# Patient Record
Sex: MALE | Race: WHITE | NOT HISPANIC OR LATINO | URBAN - METROPOLITAN AREA
[De-identification: names, ages, dates, MRNs, and addresses within clinical notes are randomized per-mention and may not be internally consistent; named-entity substitution may affect disease eponyms.]

---

## 2021-04-29 ENCOUNTER — EMERGENCY (EMERGENCY)
Facility: HOSPITAL | Age: 56
LOS: 1 days | Discharge: HOME | End: 2021-04-29
Admitting: EMERGENCY MEDICINE
Payer: COMMERCIAL

## 2021-04-29 VITALS
DIASTOLIC BLOOD PRESSURE: 77 MMHG | OXYGEN SATURATION: 96 % | HEART RATE: 71 BPM | SYSTOLIC BLOOD PRESSURE: 117 MMHG | TEMPERATURE: 98 F | RESPIRATION RATE: 16 BRPM

## 2021-04-29 DIAGNOSIS — Z20.822 CONTACT WITH AND (SUSPECTED) EXPOSURE TO COVID-19: ICD-10-CM

## 2021-04-29 LAB — SARS-COV-2 RNA SPEC QL NAA+PROBE: SIGNIFICANT CHANGE UP

## 2021-04-29 PROCEDURE — 99283 EMERGENCY DEPT VISIT LOW MDM: CPT

## 2021-04-29 PROCEDURE — 99282 EMERGENCY DEPT VISIT SF MDM: CPT

## 2021-04-29 PROCEDURE — U0005: CPT

## 2021-04-29 PROCEDURE — U0003: CPT

## 2021-04-29 NOTE — ED PROVIDER NOTE - PATIENT PORTAL LINK FT
You can access the FollowMyHealth Patient Portal offered by [facility] by registering at the following website: [email or web address]. By joining [employer]’s FollowMyHealth portal, you will also be able to view your health information using other applications (apps) compatible with our system.

## 2021-08-01 ENCOUNTER — EMERGENCY (EMERGENCY)
Facility: HOSPITAL | Age: 56
LOS: 1 days | Discharge: HOME | End: 2021-08-01
Attending: EMERGENCY MEDICINE | Admitting: EMERGENCY MEDICINE
Payer: COMMERCIAL

## 2021-08-01 VITALS
HEART RATE: 57 BPM | SYSTOLIC BLOOD PRESSURE: 138 MMHG | TEMPERATURE: 98 F | RESPIRATION RATE: 16 BRPM | OXYGEN SATURATION: 96 % | HEIGHT: 65 IN | DIASTOLIC BLOOD PRESSURE: 93 MMHG | WEIGHT: 177.91 LBS

## 2021-08-01 DIAGNOSIS — M25.551 PAIN IN RIGHT HIP: ICD-10-CM

## 2021-08-01 PROCEDURE — 99282 EMERGENCY DEPT VISIT SF MDM: CPT

## 2021-08-01 NOTE — ED ADULT NURSE NOTE - OBJECTIVE STATEMENT
56y M, A&ox3, presents to ed for right sided hip pain for x3 weeks, worsening with ambulation and movement. PT went to [facility] and had xray performed, negatie. PT told to follow up with out pt MRI however endorses pain. No weakness. Ambulatory with steady gait.

## 2021-08-01 NOTE — ED ADULT NURSE NOTE - CHIEF COMPLAINT QUOTE
Pt reports right hip pain x 3 weeks after walking long distances, denies numbness, tingling. Pt was seen at [facility] and had xray, which came back normal. Pt was told to get MRI.

## 2021-08-01 NOTE — ED PROVIDER NOTE - PATIENT PORTAL LINK FT
You can access the FollowMyHealth Patient Portal offered by [facility] by registering at the following website: [email or web address]. By joining [email or web address]’s FollowMyHealth portal, you will also be able to view your health information using other applications (apps) compatible with our system.

## 2021-08-01 NOTE — ED ADULT TRIAGE NOTE - CHIEF COMPLAINT QUOTE
Pt reports right hip pain x 3 weeks after walking long distances, denies numbness, tingling. Pt was seen at [facility] and had xray, which came back normal. Pt was told to get MRI.

## 2021-08-01 NOTE — ED PROVIDER NOTE - OBJECTIVE STATEMENT
56M p/w R hip pain. ~3w ago while in [place], pt walked 10miles. 1-2d afterwards developed R lateral hip pain, constant, worse w/ walking. [place] [facility] [place], had hip XR wnl. Has been taking 600mg motrin q6 w/ minimal relief. Went to acupuncturist who suggested getting MRI. Pt came to ED 2/2 persistent pain. On ROS pt also notes that he had slight burning/bubbly rash on L knee and L shin that started ~5d after hip pain began and has since gotten much better. No other systemic symptoms.  Denies HA, f/c, SOB/CP, NVD, focal weakness/numbness, abd pain, urinary complaints, other rashes, other joint pain. Had chicken pox when he was younger.

## 2021-08-01 NOTE — ED ADULT NURSE NOTE - NSIMPLEMENTINTERV_GEN_ALL_ED
Implemented All Universal Safety Interventions:  [place] to call system. Call bell, personal items and telephone within reach. Instruct patient to call for assistance. Room bathroom lighting operational. Non-slip footwear when patient is off stretcher. Physically safe environment: no spills, clutter or unnecessary equipment. Stretcher in lowest position, wheels locked, appropriate side rails in place.

## 2021-08-01 NOTE — ED PROVIDER NOTE - PHYSICAL EXAMINATION
L medial knee and shin: 2x quarter sized area of superficial ulceration/scabbing - pt states that initially there were tiny bubbles. No crepitus, firmness, induration, fluctuance. Skin is normal temp. No surrounding erythema/warmth.   Strength 5/5. No bony ttp, FROM all extremities. Normal equal distal pulses. Steady unassisted gait.

## 2021-08-01 NOTE — ED PROVIDER NOTE - CLINICAL SUMMARY MEDICAL DECISION MAKING FREE TEXT BOX
56M p/w R hip pain. ~3w ago while in [place], pt walked 10miles. 1-2d afterwards developed R lateral hip pain, constant, worse w/ walking. [place] [facility] [place], had hip XR wnl. Has been taking 600mg motrin q6 w/ minimal relief. Went to acupuncturist who suggested getting MRI. Pt came to ED 2/2 persistent pain. On ROS pt also notes that he had slight burning/bubbly rash on L knee and L shin that started ~5d after hip pain began and has since gotten much better. No other systemic symptoms. Vitals wnl, exam as above.  ddx: Clinically benign MSK, possible resolving shingles.   Clinically no indication for further emergent ED workup or hospitalization at this time. Comfortable for dc, outpt f/u.

## 2021-08-03 ENCOUNTER — EMERGENCY (EMERGENCY)
Facility: HOSPITAL | Age: 56
LOS: 1 days | Discharge: HOME | End: 2021-08-03
Attending: EMERGENCY MEDICINE | Admitting: EMERGENCY MEDICINE
Payer: COMMERCIAL

## 2021-08-03 VITALS
HEIGHT: 65 IN | WEIGHT: 175.05 LBS | SYSTOLIC BLOOD PRESSURE: 123 MMHG | TEMPERATURE: 100 F | RESPIRATION RATE: 18 BRPM | OXYGEN SATURATION: 96 % | DIASTOLIC BLOOD PRESSURE: 86 MMHG | HEART RATE: 82 BPM

## 2021-08-03 DIAGNOSIS — I25.10 ATHEROSCLEROTIC HEART DISEASE OF NATIVE CORONARY ARTERY WITHOUT ANGINA PECTORIS: ICD-10-CM

## 2021-08-03 DIAGNOSIS — R10.31 RIGHT LOWER QUADRANT PAIN: ICD-10-CM

## 2021-08-03 DIAGNOSIS — M79.651 PAIN IN RIGHT THIGH: ICD-10-CM

## 2021-08-03 DIAGNOSIS — M25.551 PAIN IN RIGHT HIP: ICD-10-CM

## 2021-08-03 DIAGNOSIS — E78.5 HYPERLIPIDEMIA, UNSPECIFIED: ICD-10-CM

## 2021-08-03 PROCEDURE — 99283 EMERGENCY DEPT VISIT LOW MDM: CPT

## 2021-08-03 RX ORDER — DIAZEPAM 5 MG
1 TABLET ORAL
Qty: 8 | Refills: 0
Start: 2021-08-03

## 2021-08-03 RX ORDER — DICLOFENAC SODIUM 75 MG/1
1 TABLET, DELAYED RELEASE ORAL
Qty: 8 | Refills: 0
Start: 2021-08-03

## 2021-08-03 RX ORDER — DICLOFENAC SODIUM 75 MG/1
50 TABLET, DELAYED RELEASE ORAL ONCE
Refills: 0 | Status: COMPLETED | OUTPATIENT
Start: 2021-08-03 | End: 2021-08-03

## 2021-08-03 RX ORDER — DIAZEPAM 5 MG
5 TABLET ORAL ONCE
Refills: 0 | Status: DISCONTINUED | OUTPATIENT
Start: 2021-08-03 | End: 2021-08-03

## 2021-08-03 RX ADMIN — Medication 5 MILLIGRAM(S): at 16:18

## 2021-08-03 RX ADMIN — DICLOFENAC SODIUM 50 MILLIGRAM(S): 75 TABLET, DELAYED RELEASE ORAL at 16:24

## 2021-08-03 NOTE — ED PROVIDER NOTE - NS_ATTENDINGSCRIBE_ED_ALL_ED
PDMP reviewed; therapy appropriate, no aberrant behavior identified, prescription given.   I personally performed the service described in the documentation recorded by the scribe in my presence, and it accurately and completely records my words and actions.

## 2021-08-03 NOTE — ED PROVIDER NOTE - CLINICAL SUMMARY MEDICAL DECISION MAKING FREE TEXT BOX
57 y/o M PMHx CAD, HLD, with right hip, thigh, and groin pain. Likely with muscle strain vs overuse. No objective evidence of septic joint, hernia, or serious injury. Will trial Valium and Diclofenac and reassess. 55 y/o M PMHx CAD, HLD, with right hip, thigh, and groin pain. Likely with muscle strain vs overuse. No objective evidence of septic joint, hernia, or osseous injury. Will trial Valium and Diclofenac and reassess.

## 2021-08-03 NOTE — ED ADULT TRIAGE NOTE - OTHER COMPLAINTS
c/o right sided hip pain, and upper leg pain x 3 weeks, seen in ED two days ago.  pt was unable to get in contact with ortho.

## 2021-08-03 NOTE — ED PROVIDER NOTE - OBJECTIVE STATEMENT
55 y/o M PMHx CAD, HLD. Presents with persisting right hip, thigh, and groin pain. States that 3 weeks ago he was in [place] and walked over 10 miles. He has bene having persisting hip pain since then. Pt uses 600mg Ibuprofen every 6 hours with minimal relief. He had blood work drawn yesterday, which he states was normal. Denies numbness to the lower extremities, difficulty ambulating, weakness, swelling, redness to the hip joint, groin, or thigh. 57 y/o M PMHx CAD, HLD. Presents with persisting right hip, thigh, and groin pain. States that 3 weeks ago he was in [place] and walked over 10 miles. He has been having persisting hip pain since then. Pt uses 600mg Ibuprofen every 6 hours with minimal relief. He had blood work drawn yesterday, which he states was normal sent from an email yesterday by outpt provider Dr Hood. Denies numbness to the lower extremities, difficulty ambulating, weakness, swelling, redness to the hip joint, groin, or thigh.

## 2021-08-03 NOTE — ED PROVIDER NOTE - PATIENT PORTAL LINK FT
You can access the FollowMyHealth Patient Portal offered by [facility] by registering at the following website: [email or web address]. By joining [employer]’s FollowMyHealth portal, you will also be able to view your health information using other applications (apps) compatible with our system.

## 2021-08-03 NOTE — ED PROVIDER NOTE - MUSCULOSKELETAL, MLM
Spine appears normal, range of motion is not limited, no muscle or joint tenderness. 5/5 strength to b/l LE. No swelling, redness to joint and groin, or evidence of hernia.

## 2021-08-03 NOTE — ED PROVIDER NOTE - NSFOLLOWUPINSTRUCTIONS_ED_ALL_ED_FT
Please follow up at Orthopedic resident clinic This Thursday, please call to make appointment  [phone]  [address] [facility].

## 2021-08-03 NOTE — ED ADULT NURSE NOTE - NS_ED_NURSE_TEACHING_TOPIC_ED_A_ED
Followup with orthopedic clinic on Thursday. Return to ER if symptoms continue or worsen./Orthopedic

## 2021-08-04 PROBLEM — Z00.00 ENCOUNTER FOR PREVENTIVE HEALTH EXAMINATION: Status: ACTIVE | Noted: 2021-08-04

## 2021-08-05 ENCOUNTER — APPOINTMENT (OUTPATIENT)
Dept: ORTHOPEDIC SURGERY | Facility: CLINIC | Age: 56
End: 2021-08-05
Payer: COMMERCIAL

## 2021-08-05 ENCOUNTER — OUTPATIENT (OUTPATIENT)
Dept: OUTPATIENT SERVICES | Facility: HOSPITAL | Age: 56
LOS: 1 days | End: 2021-08-05
Payer: COMMERCIAL

## 2021-08-05 ENCOUNTER — RESULT REVIEW (OUTPATIENT)
Age: 56
End: 2021-08-05

## 2021-08-05 VITALS — HEIGHT: 65 IN | BODY MASS INDEX: 29.16 KG/M2 | WEIGHT: 175 LBS

## 2021-08-05 DIAGNOSIS — Z86.79 PERSONAL HISTORY OF OTHER DISEASES OF THE CIRCULATORY SYSTEM: ICD-10-CM

## 2021-08-05 DIAGNOSIS — M25.551 PAIN IN RIGHT HIP: ICD-10-CM

## 2021-08-05 DIAGNOSIS — Z78.9 OTHER SPECIFIED HEALTH STATUS: ICD-10-CM

## 2021-08-05 DIAGNOSIS — Z72.89 OTHER PROBLEMS RELATED TO LIFESTYLE: ICD-10-CM

## 2021-08-05 PROBLEM — I25.10 ATHEROSCLEROTIC HEART DISEASE OF NATIVE CORONARY ARTERY WITHOUT ANGINA PECTORIS: Chronic | Status: ACTIVE | Noted: 2021-08-03

## 2021-08-05 PROBLEM — E78.5 HYPERLIPIDEMIA, UNSPECIFIED: Chronic | Status: ACTIVE | Noted: 2021-08-03

## 2021-08-05 PROCEDURE — 99203 OFFICE O/P NEW LOW 30 MIN: CPT

## 2021-08-05 PROCEDURE — 73502 X-RAY EXAM HIP UNI 2-3 VIEWS: CPT

## 2021-08-05 PROCEDURE — 73502 X-RAY EXAM HIP UNI 2-3 VIEWS: CPT | Mod: 26,RT

## 2021-08-08 PROBLEM — Z78.9 CURRENT NON-SMOKER: Status: ACTIVE | Noted: 2021-08-05

## 2021-08-08 PROBLEM — Z72.89 CONSUMES ALCOHOL: Status: ACTIVE | Noted: 2021-08-05

## 2021-08-08 PROBLEM — Z86.79 HISTORY OF CARDIAC DISORDER: Status: RESOLVED | Noted: 2021-08-05 | Resolved: 2021-08-08

## 2021-08-08 PROBLEM — Z78.9 DOES NOT USE ILLICIT DRUGS: Status: ACTIVE | Noted: 2021-08-05

## 2021-08-08 PROBLEM — M25.551 HIP PAIN, RIGHT: Status: ACTIVE | Noted: 2021-08-05

## 2021-08-08 RX ORDER — ROSUVASTATIN CALCIUM 20 MG/1
20 TABLET, FILM COATED ORAL
Refills: 0 | Status: ACTIVE | COMMUNITY

## 2021-08-08 RX ORDER — ROSUVASTATIN CALCIUM 5 MG/1
TABLET, FILM COATED ORAL
Refills: 0 | Status: ACTIVE | COMMUNITY

## 2021-08-08 RX ORDER — ASPIRIN 81 MG
81 TABLET, DELAYED RELEASE (ENTERIC COATED) ORAL
Refills: 0 | Status: ACTIVE | COMMUNITY

## 2021-08-08 NOTE — DISCUSSION/SUMMARY
[de-identified] : 56M healthy with atraumatic R hip pain of 3 weeks duration after walking 10 miles. Likely secondary to inflammatory flare. He will continue taking his diclofenac as prescribed and we have given him a script for physical therapy. He has to leave back to [place] on Aug 27th. In light of this, we will see him back in clinic in 2 weeks to assess for improvement. If no improvement will consider ordering MRI to further evaluate underlying pathology. In the meantime he will be WBAT RLE.

## 2021-08-08 NOTE — PHYSICAL EXAM
[de-identified] : R Hip\par Ambulates with an antalgic gait\par Hip flexion to 120 without pain\par IR 40, ER 60 without pain\par SILT saph/sural/sp/dp/t\par Negative log roll\par Negative heel strike\par DP 2+ [de-identified] : R hip XR demonstrates no signs of fracture or dislocation\par No evidence of acetabular impingement

## 2021-08-08 NOTE — HISTORY OF PRESENT ILLNESS
[de-identified] : 56M healthy complaining of R hip pain for 3 weeks duration. He was in [place] and states that he walked 10 miles with flat shoes. Three days later he developed hip pain on the flight home that has been intermittent in nature. Has been taking Ibuprofen with minimal relief. He was seen in [facility] ED recently and prescribed Diclofenac and Valium which he has been taking. Has also seen an acupuncturist and chiropractor. The pain bothers him with walking distances and is located in his groin and obturator nerve distribution. Denies any trauma, radiculopathy.

## 2021-08-19 ENCOUNTER — APPOINTMENT (OUTPATIENT)
Dept: ORTHOPEDIC SURGERY | Facility: CLINIC | Age: 56
End: 2021-08-19

## 2022-11-14 NOTE — ED PROVIDER NOTE - CPE EDP GASTRO NORM
[de-identified] : Patient continues to have left ear clogging sensation. Saturday he felt worsening of fullness and diminished hearing so started another round of steroids. He admits that it opened up for a short while this morning and then clogged again. he denies pain or pressure. He has not had any acute dizziness since the last visit  normal...

## 2024-11-21 NOTE — ED ADULT NURSE NOTE - TEMPLATE LIST FOR HEAD TO TOE ASSESSMENT
[facility]  [address] [place] [id number]  [phone]          Adilson Cloud  : 1962    Chief Complaint   Patient presents with    Elevated PSA    1 Year Follow Up          HPI     Adilson Cloud is a 62 y.o. male  with a PMH of colon cancer admitted for fatigue and concern for infection. Urology consulted for L testicle pain, elevated PSA and scrotal mass in .      He reported 1 week history of acute onset L testicle pain with increased warmth to the area.  Pain is severe.  He was started on antibiotics in the hospital which has helped his pain.  He also reports palpable mass behind testicle around the same time that pain started.  Denies fever.  No worsening LUTS.  No known cause for pain. OMARI showed left epididymitis and left hydrocele. Urine culture negative. G/C/T negative. He was tx w IV rocephin then transitioned to Cipro at [facility].      Seen 23. He reported he has finished Cipro. Reported sig improvement in sx. No c/o pain. He cont to run low grade temps, however this was present prior to epididymitis and is currently awaiting referral to rheumatology for to further eval fever and joint pain.      As for his PSA, he has never had biopsy/MRI prostate.  No FH of CAP.  PSA 3.6 in 2020 and up to 4.5 this year.  Has not had other PSAs in the past.  Most recent PSA appears to be in the middle of UTI episode. PSA was repeated on 23 at 4.9.     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             Past Medical History:   Diagnosis Date    Adult-onset Still's disease (HCC)     methotrexate to start     Anemia 22    Anxiety     ativan prn- [facility]    Cancer of sigmoid colon (HCC) 10/19/2018    Colon cancer (HCC)     surgery and chemo    
Orthopedic